# Patient Record
Sex: FEMALE | Race: WHITE | NOT HISPANIC OR LATINO | URBAN - METROPOLITAN AREA
[De-identification: names, ages, dates, MRNs, and addresses within clinical notes are randomized per-mention and may not be internally consistent; named-entity substitution may affect disease eponyms.]

---

## 2021-06-03 ENCOUNTER — OFFICE VISIT (OUTPATIENT)
Dept: GASTROENTEROLOGY | Facility: CLINIC | Age: 76
End: 2021-06-03

## 2021-06-03 VITALS — WEIGHT: 160 LBS | SYSTOLIC BLOOD PRESSURE: 150 MMHG | DIASTOLIC BLOOD PRESSURE: 60 MMHG | HEART RATE: 59 BPM

## 2021-06-03 DIAGNOSIS — R19.7 DIARRHEA, UNSPECIFIED TYPE: Primary | ICD-10-CM

## 2021-06-03 PROCEDURE — 99204 OFFICE O/P NEW MOD 45 MIN: CPT | Performed by: INTERNAL MEDICINE

## 2021-06-03 RX ORDER — MONTELUKAST SODIUM 4 MG/1
TABLET, CHEWABLE ORAL
Qty: 60 TABLET | Refills: 11 | Status: SHIPPED | OUTPATIENT
Start: 2021-06-03

## 2021-06-03 RX ORDER — SIMVASTATIN 40 MG
TABLET ORAL
COMMUNITY

## 2021-06-03 RX ORDER — ASPIRIN 81 MG/1
TABLET, CHEWABLE ORAL
COMMUNITY

## 2021-06-03 RX ORDER — NITROGLYCERIN 0.4 MG/1
TABLET SUBLINGUAL
COMMUNITY
Start: 2021-05-11

## 2021-06-03 NOTE — PROGRESS NOTES
PCP:  Kya Marshall, Kya Ellison, FAIZA  [address] [place] [address]    Chief Complaint   Patient presents with   • Diarrhea        HPI   The patient is a 75-year-old female who has had problems with diarrhea.  It sounds like the diarrhea is postprandial.  It is not every time she eats but is fairly frequent.  If she is going out she will not eat.  She can take this back to about the time of her cholecystectomy.  She has had a remote colonoscopy about 10 years ago which was normal but none recently.  There is no family history of colon polyps or cancers.  There is no bleeding.  She has not been vaccinated against Covid.  Recent blood work shows a normal white count of 8.7.  Last hemoglobin I see was from 9/24/2020 which was 14.7.    Allergies   Allergen Reactions   • Penicillins Hives          Current Outpatient Medications:   •  aspirin 81 MG chewable tablet, aspirin 81 mg chewable tablet  Chew 1 tablet every day by oral route., Disp: , Rfl:   •  colestipol (Colestid) 1 g tablet, Take 2 tablets by mouth every morning, separate 2 hours from other medications, Disp: 60 tablet, Rfl: 11  •  nitroglycerin (NITROSTAT) 0.4 MG SL tablet, , Disp: , Rfl:   •  simvastatin (ZOCOR) 40 MG tablet, simvastatin 40 mg tablet, Disp: , Rfl:      Past Medical History:   Diagnosis Date   • Hyperlipidemia        Past Surgical History:   Procedure Laterality Date   • APPENDECTOMY     • CHOLECYSTECTOMY, unsure if there was stones     • HYSTERECTOMY, BSO for benign disease, patent stent center leg although details unknown          Social History     Socioeconomic History   • Marital status:      Spouse name: Not on file   • Number of children: Not on file   • Years of education: Not on file   • Highest education level: Not on file   Tobacco Use   • Smoking status: Never Smoker   • Smokeless tobacco: Never Used        Family History   Problem Relation Age of Onset   • Colon polyps Neg  Hx    • Colon cancer Neg Hx         Review of Systems   Constitutional: Positive for fatigue.   HENT: Positive for hearing loss.    Respiratory: Positive for shortness of breath.    Cardiovascular: Negative.    Gastrointestinal: Positive for diarrhea.   Endocrine: Negative.    Musculoskeletal: Positive for back pain and myalgias.   Allergic/Immunologic: Negative.    Neurological: Negative.    Hematological: Bruises/bleeds easily.   Psychiatric/Behavioral: Negative.         Vitals:    06/03/21 1500   BP: 150/60   Pulse: 59        Physical Exam   General Appearance: Alert, in no acute distress   Head: Normocephalic, without obvious abnormality, atraumatic   Eyes: Lids and lashes normal, conjunctivae and sclerae normal, no icterus, no pallor, corneas clear, PERRLA   Ears: Ears appear intact with no abnormalities noted   Lungs: respirations regular, even and unlabored Heart: Regular rhythm and rate   Chest Wall: Symmetrical respiratory expansion   Abdomen: No masses, no organomegaly, soft non-tender, non-distended, no guarding   Extremities: Moves all extremities well, no edema, no cyanosis, no redness   Skin: No bleeding, bruising or rash   Neurologic: Cranial nerves 2 - 12 grossly intact, no focal deficits     Review of systems was reviewed and positives are noted. All of the remaining review of systems in that system are negative.    Diagnoses and all orders for this visit:    1. Diarrhea, unspecified type (Primary)    Impressions and plan #1 diarrhea: Her diarrhea is fairly classic for postcholecystectomy diarrhea.  Her diarrhea is postprandial and she has noticed it since she has had her gallbladder removed.  There is no associated bleeding.  We typically would do a colonoscopy to make sure there is no colonic pathology.  We would look for signs of ulcerative colitis or Crohn's disease.  We would look for microscopic colitis.  We would remove any polyps that may be present.      We will going to recommend a trial  of Colestid.  She will take 2 in the morning.  She takes her other medications in the evening so that should not be a problem  the medications.  The Colestid can interfere with absorption of other medications if taken together.  If the Colestid is working too well she will stop it until she is having bowel movements again.  She would then restarted at half the dose.  The Colestid tends to work fairly quickly if it is going to be helpful.  Usually a response would be evident within a week.  If it is not helping I think at that point we need to evaluate further.  She is 75 and is not sure if she wants a colonoscopy at this point.  We discussed trying the medication empirically.  If she does not do well we can certainly always do a colonoscopy later.  If she does well and she wants to forego the colonoscopy,  I think that is reasonable.    Del Blanco MD